# Patient Record
Sex: MALE | Race: WHITE | NOT HISPANIC OR LATINO
[De-identification: names, ages, dates, MRNs, and addresses within clinical notes are randomized per-mention and may not be internally consistent; named-entity substitution may affect disease eponyms.]

---

## 2018-09-14 ENCOUNTER — TRANSCRIPTION ENCOUNTER (OUTPATIENT)
Age: 43
End: 2018-09-14

## 2019-01-28 ENCOUNTER — TRANSCRIPTION ENCOUNTER (OUTPATIENT)
Age: 44
End: 2019-01-28

## 2019-03-17 ENCOUNTER — TRANSCRIPTION ENCOUNTER (OUTPATIENT)
Age: 44
End: 2019-03-17

## 2021-02-20 ENCOUNTER — TRANSCRIPTION ENCOUNTER (OUTPATIENT)
Age: 46
End: 2021-02-20

## 2021-03-28 ENCOUNTER — TRANSCRIPTION ENCOUNTER (OUTPATIENT)
Age: 46
End: 2021-03-28

## 2021-10-27 PROBLEM — Z00.00 ENCOUNTER FOR PREVENTIVE HEALTH EXAMINATION: Status: ACTIVE | Noted: 2021-10-27

## 2021-10-29 ENCOUNTER — APPOINTMENT (OUTPATIENT)
Dept: PULMONOLOGY | Facility: CLINIC | Age: 46
End: 2021-10-29
Payer: COMMERCIAL

## 2021-10-29 ENCOUNTER — NON-APPOINTMENT (OUTPATIENT)
Age: 46
End: 2021-10-29

## 2021-10-29 VITALS
WEIGHT: 183 LBS | TEMPERATURE: 97.3 F | SYSTOLIC BLOOD PRESSURE: 130 MMHG | HEART RATE: 60 BPM | HEIGHT: 73 IN | RESPIRATION RATE: 16 BRPM | BODY MASS INDEX: 24.25 KG/M2 | DIASTOLIC BLOOD PRESSURE: 70 MMHG | OXYGEN SATURATION: 99 %

## 2021-10-29 DIAGNOSIS — Z80.42 FAMILY HISTORY OF MALIGNANT NEOPLASM OF PROSTATE: ICD-10-CM

## 2021-10-29 DIAGNOSIS — Z88.9 ALLERGY STATUS TO UNSPECIFIED DRUGS, MEDICAMENTS AND BIOLOGICAL SUBSTANCES: ICD-10-CM

## 2021-10-29 DIAGNOSIS — Z86.19 PERSONAL HISTORY OF OTHER INFECTIOUS AND PARASITIC DISEASES: ICD-10-CM

## 2021-10-29 DIAGNOSIS — Z82.62 FAMILY HISTORY OF OSTEOPOROSIS: ICD-10-CM

## 2021-10-29 DIAGNOSIS — Z82.49 FAMILY HISTORY OF ISCHEMIC HEART DISEASE AND OTHER DISEASES OF THE CIRCULATORY SYSTEM: ICD-10-CM

## 2021-10-29 DIAGNOSIS — Z81.2 FAMILY HISTORY OF TOBACCO ABUSE AND DEPENDENCE: ICD-10-CM

## 2021-10-29 DIAGNOSIS — Z83.6 FAMILY HISTORY OF OTHER DISEASES OF THE RESPIRATORY SYSTEM: ICD-10-CM

## 2021-10-29 DIAGNOSIS — Z78.9 OTHER SPECIFIED HEALTH STATUS: ICD-10-CM

## 2021-10-29 PROCEDURE — 94618 PULMONARY STRESS TESTING: CPT

## 2021-10-29 PROCEDURE — 99204 OFFICE O/P NEW MOD 45 MIN: CPT | Mod: 25

## 2021-10-29 PROCEDURE — 94010 BREATHING CAPACITY TEST: CPT

## 2021-10-29 PROCEDURE — 71046 X-RAY EXAM CHEST 2 VIEWS: CPT

## 2021-10-29 PROCEDURE — 95012 NITRIC OXIDE EXP GAS DETER: CPT

## 2021-10-29 RX ORDER — ALBUTEROL SULFATE 90 UG/1
108 (90 BASE) AEROSOL, METERED RESPIRATORY (INHALATION) EVERY 6 HOURS
Qty: 1 | Refills: 1 | Status: ACTIVE | COMMUNITY
Start: 2021-10-29 | End: 1900-01-01

## 2021-10-29 NOTE — ASSESSMENT
[FreeTextEntry1] : Mr. CASTANEDA is a 45 year male with a history of childhood chickenpox, allergies,  nonsmoker who now comes in for an initial pulmonary evaluation witnessed apnea, SOB, PND syndrome, mild GERD, likely mild asthma, ?MARTY\par \par The patient's SOB is felt to be multifactorial:\par -poor mechanics of breathing\par -Pulmonary\par     -mild asthma (RF; allergies, GERD)\par -Cardiac (not likely)\par \par Problem 1:mild asthma (RF; allergies, GERD)\par - add Ventolin 2 puffs Q6H, pre-exercise \par - Asthma is believed to be caused by inherited (genetic) and environmental factor, but its exact cause is unknown. asthma may be triggered by allergens, lung infections, or irritants in the air. Asthma triggers are different for each person \par \par Problem 2: allergy/sinus\par -add Olopatadine 0.6% 1 sniff BID\par -add Qnasl 1 sniff BID\par -get Blood work to include: asthma panel, food IgE panel, IgE level, eosinophil level, vitamin D level\par -Environmental measures for allergies were encouraged including mattress and pillow cover, air purifier, and environmental controls. \par \par Problem 3: GERD\par -Add DGL, Pre-meal \par -Rule of 2s: avoid eating too much, eating too late, eating too spicy, eating two hours before bed.\par - Things to avoid including overeating, spicy foods, tight clothing, eating within two hours of bed, this list is not all inclusive.\par - For treatments of reflux, possible options discussed including diet control, H2 blockers, PPIs, as well as coating motility agents discussed as treatment options. Timing of meals and proximity of last meal to sleep were discussed. If symptoms persist, a formal gastrointestinal evaluation is needed. \par \par Problem 4: witnessed apnea, ?MARTY (RF: Elevated Mallampati class) \par -complete home sleep study\par -Sleep apnea is associated with adverse clinical consequences which can affect most organ systems. Cardiovascular disease risk includes arrhythmias, atrial fibrillation, hypertension, coronary artery disease, and stroke. Metabolic disorders include diabetes type 2, non-alcoholic fatty liver disease. Mood disorder especially depression; and cognitive decline especially in the elderly. Associations with chronic reflux/Lomax’s esophagus some but not all inclusive. \par -Reasons include arousal consistent with hypopnea; respiratory events most prominent in REM sleep or supine position; therefore sleep staging and body position are important for accurate diagnosis and estimation of AHI. \par  \par \par Problem 5:Cardiac (not likely)\par -Recommend cardiac follow up evaluation with cardiologist if needed \par \par Problem 6: Poor mechanics of breathing\par -Recommended Wim Hof and Buteyko breathing techniques \par - Proper breathing techniques were reviewed with an emphasis on exhalation. Patient instructed to breath in for 1 second and out for four seconds. Patient was encouraged not to talk while walking.\par \par Problem 7: Health Maintenance\par -s/p flu shot\par -recommended strep pneumonia vaccines: Prevnar-13 vaccine, follow by Pneumo vaccine 23 one year following\par -recommended early intervention for URIs\par -recommended regular osteoporosis evaluations\par -recommended early dermatological evaluations\par -recommended after the age of 50 to the age of 70, colonoscopy every 5 years\par \par f/u in 6-8 weeks\par pt is encouraged to call or fax the office with any questions or concerns.\par

## 2021-10-29 NOTE — REASON FOR VISIT
[Initial] : an initial visit [TextBox_44] : witnessed apnea, SOB, PND syndrome, mild GERD, likely mild asthma, ?MARTY

## 2021-10-29 NOTE — PROCEDURE
[FreeTextEntry1] : 6 minute walk test reveals a low saturation of 98% with no evidence of dyspnea or fatigue; walked 505.3 meters\par \par PFT revealed normal flows, with a FEV1 of 3.9L, which is 91% of predicted, with a normal flow volume loop\par \par Feno was 20; a normal value being less than 25. Fractional exhaled nitric oxide (FENO) is regarded as a simple, noninvasive method for assessing eosinophilic airway inflammation. Produced by a variety of cells within the lung, nitric oxide (NO) concentrations are generally low in healthy individuals. However, high concentrations of NO appear to be involved in nonspecific host defense mechanisms and chronic inflammatory  diseases such as asthma. The American Thoracic Society (ATS) therefore recommended using FENO to aid in the diagnosis and monitoring of eosinophilic airway inflammation and asthma, and for identifying steroid responsive individuals whose chronic respiratory symptoms may be caused by airway inflammation \par \par CXR revealed a normal sized heart; there was no evidence of infiltrate or effusion -- A normal appearing chest radiograph \par

## 2021-10-29 NOTE — ADDENDUM
[FreeTextEntry1] : Documented by Jaime Ch acting as a scribe for Dr. Varun Mascorro on (10/29/2021).\par \par All medical record entries made by the Scribe were at my, Dr. Varun Mascorro's, direction and personally dictated by me on (10/29/2021). I have reviewed the chart and agree that the record accurately reflects my personal performance of the history, physical exam, assessment and plan. I have also personally directed, reviewed, and agree with the discharge instructions.\par

## 2021-10-29 NOTE — HISTORY OF PRESENT ILLNESS
[TextBox_4] : Mr. CASTANEDA is a 45 year male with a history of childhood chickenpox, allergies,  nonsmoker who now comes in for an initial pulmonary evaluation. His chief complaint is\par -He notes witnessed episodes of apnea for years \par -s/p Covid 19 vaccine x2 4/2021\par -He denies getting full lung capacity\par -He notes SOB \par -He notes slight SOB exacerbation in cold air which brings on coughing \par -He notes itchy ears with allergies\par -He notes sneezing and PND sx for allergies during fall and spring \par -He notes recently starting heartburn and reflux \par -He notes globus sensation starting 1-2 years ago \par -He notes breathing through his mouth due to difficulty breathing through his nose\par -He notes his weight is stable \par -he reports being able to fall asleep as a passenger in a car for over an hour \par - he reports being able to fall asleep while watching a boring TV show \par -He notes his memory and concentration are good\par -He denies h/o bronchitis \par -He notes 15.5 neck size \par -He notes libido is normal \par -He notes increased sleep since working partially from home \par - \par \par - patient denies any headaches, nausea, vomiting, fever, chills, sweats, chest pain, chest pressure, palpitations, coughing, wheezing, fatigue, diarrhea, constipation, dysphagia, myalgias, dizziness, leg swelling, leg pain, itchy eyes, heartburn, reflux or sour taste in the mouth

## 2021-11-12 ENCOUNTER — LABORATORY RESULT (OUTPATIENT)
Age: 46
End: 2021-11-12

## 2021-11-12 LAB
24R-OH-CALCIDIOL SERPL-MCNC: 82.1 PG/ML
25(OH)D3 SERPL-MCNC: 40.9 NG/ML
BASOPHILS # BLD AUTO: 0.03 K/UL
BASOPHILS NFR BLD AUTO: 0.6 %
EOSINOPHIL # BLD AUTO: 0.07 K/UL
EOSINOPHIL NFR BLD AUTO: 1.5 %
HCT VFR BLD CALC: 39.9 %
HGB BLD-MCNC: 13.4 G/DL
IMM GRANULOCYTES NFR BLD AUTO: 0.4 %
LYMPHOCYTES # BLD AUTO: 1.46 K/UL
LYMPHOCYTES NFR BLD AUTO: 31.5 %
MAN DIFF?: NORMAL
MCHC RBC-ENTMCNC: 33 PG
MCHC RBC-ENTMCNC: 33.6 GM/DL
MCV RBC AUTO: 98.3 FL
MONOCYTES # BLD AUTO: 0.37 K/UL
MONOCYTES NFR BLD AUTO: 8 %
NEUTROPHILS # BLD AUTO: 2.69 K/UL
NEUTROPHILS NFR BLD AUTO: 58 %
PLATELET # BLD AUTO: 199 K/UL
RBC # BLD: 4.06 M/UL
RBC # FLD: 12.2 %
WBC # FLD AUTO: 4.64 K/UL

## 2021-11-14 LAB
A ALTERNATA IGE QN: <0.1 KUA/L
A FUMIGATUS IGE QN: <0.1 KUA/L
C ALBICANS IGE QN: <0.1 KUA/L
C HERBARUM IGE QN: <0.1 KUA/L
CAT DANDER IGE QN: 0.65 KUA/L
CLAM IGE QN: <0.1 KUA/L
CODFISH IGE QN: <0.1 KUA/L
COMMON RAGWEED IGE QN: <0.1 KUA/L
CORN IGE QN: <0.1 KUA/L
COW MILK IGE QN: <0.1 KUA/L
D FARINAE IGE QN: 0.63 KUA/L
D PTERONYSS IGE QN: 0.43 KUA/L
DEPRECATED A ALTERNATA IGE RAST QL: 0
DEPRECATED A FUMIGATUS IGE RAST QL: 0
DEPRECATED C ALBICANS IGE RAST QL: 0
DEPRECATED C HERBARUM IGE RAST QL: 0
DEPRECATED CAT DANDER IGE RAST QL: 1
DEPRECATED CLAM IGE RAST QL: 0
DEPRECATED CODFISH IGE RAST QL: 0
DEPRECATED COMMON RAGWEED IGE RAST QL: 0
DEPRECATED CORN IGE RAST QL: 0
DEPRECATED COW MILK IGE RAST QL: 0
DEPRECATED D FARINAE IGE RAST QL: 1
DEPRECATED D PTERONYSS IGE RAST QL: 1
DEPRECATED DOG DANDER IGE RAST QL: 0
DEPRECATED EGG WHITE IGE RAST QL: 0
DEPRECATED M RACEMOSUS IGE RAST QL: 0
DEPRECATED PEANUT IGE RAST QL: 0
DEPRECATED ROACH IGE RAST QL: 0
DEPRECATED SCALLOP IGE RAST QL: <0.1 KUA/L
DEPRECATED SESAME SEED IGE RAST QL: 0
DEPRECATED SHRIMP IGE RAST QL: 0
DEPRECATED SOYBEAN IGE RAST QL: 0
DEPRECATED TIMOTHY IGE RAST QL: 0
DEPRECATED WALNUT IGE RAST QL: 0
DEPRECATED WHEAT IGE RAST QL: 0
DEPRECATED WHITE OAK IGE RAST QL: 0
DOG DANDER IGE QN: <0.1 KUA/L
EGG WHITE IGE QN: <0.1 KUA/L
M RACEMOSUS IGE QN: <0.1 KUA/L
PEANUT IGE QN: <0.1 KUA/L
ROACH IGE QN: <0.1 KUA/L
SCALLOP IGE QN: 0
SCALLOP IGE QN: <0.1 KUA/L
SESAME SEED IGE QN: <0.1 KUA/L
SOYBEAN IGE QN: <0.1 KUA/L
TIMOTHY IGE QN: <0.1 KUA/L
TOTAL IGE SMQN RAST: 11 KU/L
WALNUT IGE QN: <0.1 KUA/L
WHEAT IGE QN: <0.1 KUA/L
WHITE OAK IGE QN: <0.1 KUA/L

## 2021-11-17 ENCOUNTER — NON-APPOINTMENT (OUTPATIENT)
Age: 46
End: 2021-11-17

## 2021-12-17 ENCOUNTER — APPOINTMENT (OUTPATIENT)
Dept: PULMONOLOGY | Facility: CLINIC | Age: 46
End: 2021-12-17
Payer: COMMERCIAL

## 2021-12-17 PROCEDURE — 99441: CPT

## 2022-01-07 ENCOUNTER — APPOINTMENT (OUTPATIENT)
Dept: PULMONOLOGY | Facility: CLINIC | Age: 47
End: 2022-01-07

## 2022-01-10 ENCOUNTER — APPOINTMENT (OUTPATIENT)
Dept: PULMONOLOGY | Facility: CLINIC | Age: 47
End: 2022-01-10
Payer: COMMERCIAL

## 2022-01-10 PROCEDURE — 99214 OFFICE O/P EST MOD 30 MIN: CPT | Mod: 95

## 2022-01-10 NOTE — ADDENDUM
[FreeTextEntry1] : Documented by Ailyn Aleman acting as a scribe for Dr. Varun Mascorro on (01/10/2022).\par \par All medical record entries made by the Scribe were at my, Dr. Varun Mascorro's, direction and personally dictated by me on (01/10/2022). I have reviewed the chart and agree that the record accurately reflects my personal performance of the history, physical exam, assessment and plan. I have also personally directed, reviewed, and agree with the discharge instructions.\par \par

## 2022-01-10 NOTE — PROCEDURE
[FreeTextEntry1] : Sleep study (nov.29.2021) revealed sleep apnea with an AHI/OLGA of 5.6 , snore index of % and a low oxygen saturation of 88%\par

## 2022-01-10 NOTE — HISTORY OF PRESENT ILLNESS
[Home] : at home, [unfilled] , at the time of the visit. [Medical Office: ([facility])___] : at the medical office located in  [TextBox_4] : Mr. CASTANEDA is a 46 year male with a history of childhood chickenpox, allergies,  nonsmoker who now comes in via video call for a follow up pulmonary evaluation. His chief complaint is\par - he notes he has been feeling fine \par - he has been using the 2 new sprays prescribed for him and they have been helpful. \par - he has been breathing fine \par - he has been exercising, he runs for exercise \par - energy level has been good \par - no new medications, vitamins, or supplements \par - he has been taking DGL supplement and he finds it has helped a little \par - he does not feel heart burn regularly. \par - weight has been stable \par patient denies any headaches, nausea, vomiting, fever, chills, sweats, chest pain, chest pressure, palpitations, coughing, wheezing, fatigue, diarrhea, constipation, dysphagia, myalgias, dizziness, leg swelling, leg pain, itchy eyes, itchy ears, heartburn, reflux or sour taste in the mouth\par

## 2022-01-10 NOTE — ASSESSMENT
[FreeTextEntry1] : Mr. CASTANEDA is a 46 year male with a history of childhood chickenpox, allergies,  nonsmoker who now comes in via video call for a follow upl pulmonary evaluation witnessed apnea, SOB, PND syndrome, mild GERD, likely mild asthma, allergies,  (+)MARTY \par \par The patient's SOB is felt to be multifactorial:\par -poor mechanics of breathing\par -Pulmonary\par     -mild asthma (RF; allergies, GERD)\par -Cardiac (not likely)\par \par Problem 1:mild asthma (RF; allergies, GERD)\par - continue Ventolin 2 puffs Q6H, pre-exercise \par - Asthma is believed to be caused by inherited (genetic) and environmental factor, but its exact cause is unknown. asthma may be triggered by allergens, lung infections, or irritants in the air. Asthma triggers are different for each person \par \par Problem 2: allergy/sinus\par -continue Olopatadine 0.6% 1 sniff BID\par -continue Qnasl 1 sniff BID\par -s/p Blood work to include: asthma panel(+), food IgE panel(-), IgE level(-), eosinophil level(-), vitamin D level(-)\par -Environmental measures for allergies were encouraged including mattress and pillow cover, air purifier, and environmental controls. \par \par Problem 3: GERD\par -Add DGL, Pre-meal \par -Rule of 2s: avoid eating too much, eating too late, eating too spicy, eating two hours before bed.\par - Things to avoid including overeating, spicy foods, tight clothing, eating within two hours of bed, this list is not all inclusive.\par - For treatments of reflux, possible options discussed including diet control, H2 blockers, PPIs, as well as coating motility agents discussed as treatment options. Timing of meals and proximity of last meal to sleep were discussed. If symptoms persist, a formal gastrointestinal evaluation is needed. \par \par Problem 4: witnessed apnea, (+)MARTY (RF: Elevated Mallampati class) (+) OSAS \par -s/p home sleep study\par - recommended Somnifix and Slumber Bump \par -Sleep apnea is associated with adverse clinical consequences which can affect most organ systems. Cardiovascular disease risk includes arrhythmias, atrial fibrillation, hypertension, coronary artery disease, and stroke. Metabolic disorders include diabetes type 2, non-alcoholic fatty liver disease. Mood disorder especially depression; and cognitive decline especially in the elderly. Associations with chronic reflux/Lomax’s esophagus some but not all inclusive. \par -Reasons include arousal consistent with hypopnea; respiratory events most prominent in REM sleep or supine position; therefore sleep staging and body position are important for accurate diagnosis and estimation of AHI. \par  \par \par Problem 5:Cardiac (not likely) at this time \par -Recommend cardiac follow up evaluation with cardiologist if needed \par \par Problem 6: Poor mechanics of breathing\par -Recommended Wim Hof and Buteyko breathing techniques \par - Proper breathing techniques were reviewed with an emphasis on exhalation. Patient instructed to breath in for 1 second and out for four seconds. Patient was encouraged not to talk while walking.\par \par Problem 7: Health Maintenance\par -s/p flu shot 2021 \par - s/p covid-19 x3 \par -recommended strep pneumonia vaccines: Prevnar-13 vaccine, follow by Pneumo vaccine 23 one year following\par -recommended early intervention for URIs\par -recommended regular osteoporosis evaluations\par -recommended early dermatological evaluations\par -recommended after the age of 50 to the age of 70, colonoscopy every 5 years\par \par f/u in 6-8 weeks\par pt is encouraged to call or fax the office with any questions or concerns.\par

## 2022-01-10 NOTE — REASON FOR VISIT
[Follow-Up] : a follow-up visit [TextBox_44] : via video call: witnessed apnea, SOB, PND syndrome, mild GERD, likely mild asthma, (+)MARTY mild

## 2022-07-14 ENCOUNTER — NON-APPOINTMENT (OUTPATIENT)
Age: 47
End: 2022-07-14

## 2022-12-20 ENCOUNTER — RX RENEWAL (OUTPATIENT)
Age: 47
End: 2022-12-20

## 2023-01-06 ENCOUNTER — APPOINTMENT (OUTPATIENT)
Dept: PULMONOLOGY | Facility: CLINIC | Age: 48
End: 2023-01-06
Payer: COMMERCIAL

## 2023-01-06 PROCEDURE — 99213 OFFICE O/P EST LOW 20 MIN: CPT | Mod: 95

## 2023-01-16 NOTE — REVIEW OF SYSTEMS
[Seasonal Allergies] : seasonal allergies [Negative] : Psychiatric [Hay Fever] : no hay fever [Watery Eyes] : no watery eyes [Itchy Eyes] : no itchy eyes [Nasal Discharge] : no nasal discharge [Hives] : no hives [Angioedema] : no angioedema [Immunocompromised] : not immunocompromised

## 2023-01-16 NOTE — HISTORY OF PRESENT ILLNESS
[TextBox_4] : Mr. CASTANEDA is a 47 year male with a history of childhood chickenpox, allergies, nonsmoker who now presents via video call for a follow up pulmonary evaluation. \par \par His chief complaint is medication renewal.\par \par Patient reports he is in normal state of health. He reports he uses olopatadine and qnasl nasal spray with benefit. Patient states he hasn't need to use Ventolin rescue inhaler. \par Patient denies GERD. He reports he sleeps well, appetite is good and weight is stable.\par \par Patient denies fever, chills, SOB @ rest or exertion, cough, wheezing, nasal congestion, runny nose or heartburn/reflux.\par \par

## 2023-01-16 NOTE — REASON FOR VISIT
[Home] : at home, [unfilled] , at the time of the visit. [Medical Office: ([facility])___] : at the medical office located in  [Patient] : the patient [Follow-Up] : a follow-up visit [Asthma] : asthma

## 2023-01-16 NOTE — ASSESSMENT
[FreeTextEntry1] : Mr. CASTANEDA is a 47 year male with a history of childhood chickenpox, allergies, nonsmoker who now presents via video call for a follow up pulmonary evaluation. \par \par His chief complaint is medication renewal.\par \par 1. Allergy\par -continue Olopatadine 0.6% 1 sniff BID\par -continue Qnasl 1 sniff BID\par \par 2.mild asthma\par - continue Ventolin 2 puffs Q6H, pre-exercise \par \par Patient to follow up with Dr. Mascorro in 2 month with full PFTs.\par Patient to call with further questions and concerns.\par Patient verbalizes understanding of care plan and is agreeable.\par

## 2023-08-15 ENCOUNTER — APPOINTMENT (OUTPATIENT)
Dept: PULMONOLOGY | Facility: CLINIC | Age: 48
End: 2023-08-15
Payer: COMMERCIAL

## 2023-08-15 VITALS
SYSTOLIC BLOOD PRESSURE: 128 MMHG | WEIGHT: 172 LBS | HEIGHT: 73 IN | OXYGEN SATURATION: 99 % | TEMPERATURE: 97.2 F | DIASTOLIC BLOOD PRESSURE: 64 MMHG | HEART RATE: 44 BPM | BODY MASS INDEX: 22.8 KG/M2 | RESPIRATION RATE: 16 BRPM

## 2023-08-15 DIAGNOSIS — R06.83 SNORING: ICD-10-CM

## 2023-08-15 PROCEDURE — 94010 BREATHING CAPACITY TEST: CPT

## 2023-08-15 PROCEDURE — 95012 NITRIC OXIDE EXP GAS DETER: CPT

## 2023-08-15 PROCEDURE — 99214 OFFICE O/P EST MOD 30 MIN: CPT | Mod: 25

## 2023-08-15 RX ORDER — FLUTICASONE PROPIONATE 50 UG/1
50 SPRAY, METERED NASAL TWICE DAILY
Qty: 3 | Refills: 1 | Status: ACTIVE | COMMUNITY

## 2023-08-15 NOTE — ADDENDUM
[FreeTextEntry1] : Documented by Pranav Faulkner acting as a scribe for Dr. Varun Mascorro on 08/15/2023 .  All medical record entries made by the Scribe were at my, Dr. Varun Mascorro's, direction and personally dictated by me on 08/15/2023 . I have reviewed the chart and agree that the record accurately reflects my personal performance of the history, physical exam, assessment and plan. I have also personally directed, reviewed, and agree with the discharge instructions.

## 2023-08-15 NOTE — PROCEDURE
[FreeTextEntry1] : PFT reveals normal flows, with an FEV1 of 4.05 L, which is 93% of predicted, with a flattened inspiratory limb. PFTs were performed to evaluate for Asthma  FENO was 13; a normal value being less than 25 Fractional exhaled nitric oxide (FENO) is regarded as a simple, noninvasive method for assessing eosinophilic airway inflammation. Produced by a variety of cells within the lung, nitric oxide (NO) concentrations are generally low in healthy individuals. However, high concentrations of NO appear to be involved in nonspecific host defense mechanisms and chronic inflammatory diseases such as asthma. The American Thoracic Society (ATS) therefore has recommended using FENO to aid in the diagnosis and monitoring of eosinophilic airway inflammation and asthma, and for identifying steroid responsive individuals whose chronic respiratory symptoms may be caused by airway inflammation.

## 2023-08-15 NOTE — ASSESSMENT
[FreeTextEntry1] : Mr. CASTANEDA is a 47 year male with a history of childhood chickenpox, allergies, nonsmoker who now comes in for a follow up pulmonary evaluation witnessed apnea, SOB, PND syndrome, mild GERD, likely mild asthma, allergies, (+)MARTY - Stable  The patient's SOB is felt to be multifactorial: -poor mechanics of breathing -Pulmonary     -mild asthma (RF; allergies, GERD) -Cardiac (not likely)  Problem 1: mild asthma (RF; allergies, GERD) - continue Ventolin 2 puffs Q6H, pre-exercise  - Asthma is believed to be caused by inherited (genetic) and environmental factor, but its exact cause is unknown. asthma may be triggered by allergens, lung infections, or irritants in the air. Asthma triggers are different for each person   Problem 2: allergy/sinus -continue Olopatadine 0.6% 1 sniff BID -continue Qnasl 1 sniff BID -s/p Blood work to include: asthma panel(+), food IgE panel(-), IgE level(-), eosinophil level(-), vitamin D level(-) -Environmental measures for allergies were encouraged including mattress and pillow cover, air purifier, and environmental controls.   Problem 3: GERD -Add DGL, Pre-meal  -Rule of 2s: avoid eating too much, eating too late, eating too spicy, eating two hours before bed. - Things to avoid including overeating, spicy foods, tight clothing, eating within two hours of bed, this list is not all inclusive. - For treatments of reflux, possible options discussed including diet control, H2 blockers, PPIs, as well as coating motility agents discussed as treatment options. Timing of meals and proximity of last meal to sleep were discussed. If symptoms persist, a formal gastrointestinal evaluation is needed.   Problem 4: witnessed apnea, (+) MARTY (RF: Elevated Mallampati class) (+) OSAS  -s/p home sleep study - Insignificant and controlled with nasal sprays  - recommended Somnifix and Slumber Bump  -Sleep apnea is associated with adverse clinical consequences which can affect most organ systems. Cardiovascular disease risk includes arrhythmias, atrial fibrillation, hypertension, coronary artery disease, and stroke. Metabolic disorders include diabetes type 2, non-alcoholic fatty liver disease. Mood disorder especially depression; and cognitive decline especially in the elderly. Associations with chronic reflux/Lomax's esophagus some but not all inclusive.  -Reasons include arousal consistent with hypopnea; respiratory events most prominent in REM sleep or supine position; therefore sleep staging and body position are important for accurate diagnosis and estimation of AHI.     Problem 5:Cardiac (not likely) at this time  -Recommend cardiac follow up evaluation with cardiologist if needed   Problem 6: Poor mechanics of breathing -Recommended Wichristin Hof and Buteyko breathing techniques  - Proper breathing techniques were reviewed with an emphasis on exhalation. Patient instructed to breath in for 1 second and out for four seconds. Patient was encouraged not to talk while walking.  Problem 7: Health Maintenance -s/p flu shot 2021  - s/p covid-19 x3  -recommended strep pneumonia vaccines: Prevnar-13 vaccine, follow by Pneumo vaccine 23 one year following -recommended early intervention for URIs -recommended regular osteoporosis evaluations -recommended early dermatological evaluations -recommended after the age of 50 to the age of 70, colonoscopy every 5 years  f/u in 6-8 weeks pt is encouraged to call or fax the office with any questions or concerns.

## 2023-08-15 NOTE — HISTORY OF PRESENT ILLNESS
[TextBox_4] : Mr. CASTANEDA is a 47 year male with a history of childhood chickenpox, allergies,  nonsmoker who now comes in for a pulmonary evaluation. His chief complaint is  - he notes feeling generally well - he notes his sinuses are clear - he notes sticking to his sinus regimen which is helping keep them clear - vision is stable - he notes running with no limitations - he notes his heartburn and reflux is controlled and better - he notes weight training and running 4-6 miles for exercise - he notes his weight is stable - he notes he was unaffected by the [employer] air - he notes no new medications, vitamins, or supplements  -he denies any headaches, nausea, emesis, fever, chills, sweats, chest pain, chest pressure, coughing, wheezing, palpitations, constipation, diarrhea, vertigo, dysphagia, heartburn, reflux, itchy eyes, itchy ears, leg swelling, leg pain, arthralgias, myalgias, or sour taste in the mouth.

## 2023-08-15 NOTE — REASON FOR VISIT
[Follow-Up] : a follow-up visit [TextBox_44] : witnessed apnea, SOB, PND syndrome, mild GERD, likely mild asthma, (+)MARTY mild

## 2024-02-09 ENCOUNTER — TRANSCRIPTION ENCOUNTER (OUTPATIENT)
Age: 49
End: 2024-02-09

## 2024-02-09 ENCOUNTER — APPOINTMENT (OUTPATIENT)
Dept: PULMONOLOGY | Facility: CLINIC | Age: 49
End: 2024-02-09
Payer: COMMERCIAL

## 2024-02-09 VITALS
WEIGHT: 172 LBS | DIASTOLIC BLOOD PRESSURE: 70 MMHG | OXYGEN SATURATION: 97 % | TEMPERATURE: 97.1 F | SYSTOLIC BLOOD PRESSURE: 110 MMHG | RESPIRATION RATE: 16 BRPM | HEIGHT: 73 IN | HEART RATE: 71 BPM | BODY MASS INDEX: 22.8 KG/M2

## 2024-02-09 DIAGNOSIS — R06.02 SHORTNESS OF BREATH: ICD-10-CM

## 2024-02-09 DIAGNOSIS — J45.20 MILD INTERMITTENT ASTHMA, UNCOMPLICATED: ICD-10-CM

## 2024-02-09 DIAGNOSIS — R06.81 APNEA, NOT ELSEWHERE CLASSIFIED: ICD-10-CM

## 2024-02-09 DIAGNOSIS — K21.9 GASTRO-ESOPHAGEAL REFLUX DISEASE W/OUT ESOPHAGITIS: ICD-10-CM

## 2024-02-09 DIAGNOSIS — Z23 ENCOUNTER FOR IMMUNIZATION: ICD-10-CM

## 2024-02-09 DIAGNOSIS — J45.909 UNSPECIFIED ASTHMA, UNCOMPLICATED: ICD-10-CM

## 2024-02-09 DIAGNOSIS — J30.9 ALLERGIC RHINITIS, UNSPECIFIED: ICD-10-CM

## 2024-02-09 DIAGNOSIS — G47.33 OBSTRUCTIVE SLEEP APNEA (ADULT) (PEDIATRIC): ICD-10-CM

## 2024-02-09 PROCEDURE — 99214 OFFICE O/P EST MOD 30 MIN: CPT | Mod: 25

## 2024-02-09 PROCEDURE — 90715 TDAP VACCINE 7 YRS/> IM: CPT

## 2024-02-09 PROCEDURE — 90471 IMMUNIZATION ADMIN: CPT

## 2024-02-09 PROCEDURE — 95012 NITRIC OXIDE EXP GAS DETER: CPT

## 2024-02-09 PROCEDURE — 94010 BREATHING CAPACITY TEST: CPT

## 2024-02-09 NOTE — ADDENDUM
[FreeTextEntry1] : Documented by Esteban Pinto acting as a scribe for Dr. Varun Mascorro on 02/09/2024. All medical record entries made by the Scribe were at my, Dr. Varun Mascorro's, direction and personally dictated by me on 02/09/2024. I have reviewed the chart and agree that the record accurately reflects my personal performance of the history, physical exam, assessment and plan. I have also personally directed, reviewed, and agree with the discharge instructions.

## 2024-02-09 NOTE — ASSESSMENT
[FreeTextEntry1] : Mr. CASTANEDA is a 48 year male with a history of childhood chickenpox, allergies, nonsmoker who now comes in for a follow up pulmonary evaluation witnessed apnea, SOB, PND syndrome, mild GERD, likely mild asthma, allergies, (+)MARTY - Stable/thriving   The patient's SOB is felt to be multifactorial: -poor mechanics of breathing -Pulmonary     -mild asthma (RF; allergies, GERD) -Cardiac (not likely)  Problem 1: mild asthma (RF; allergies, GERD) - continue Ventolin 2 puffs Q6H, pre-exercise  - Asthma is believed to be caused by inherited (genetic) and environmental factor, but its exact cause is unknown. asthma may be triggered by allergens, lung infections, or irritants in the air. Asthma triggers are different for each person   Problem 2: allergy/sinus -continue Olopatadine 0.6% 1 sniff BID -continue Qnasl 1 sniff BID -s/p Blood work to include: asthma panel(+), food IgE panel(-), IgE level(-), eosinophil level(-), vitamin D level(-) -Environmental measures for allergies were encouraged including mattress and pillow cover, air purifier, and environmental controls.   Problem 3: GERD -Add DGL, Pre-meal  -Rule of 2s: avoid eating too much, eating too late, eating too spicy, eating two hours before bed. - Things to avoid including overeating, spicy foods, tight clothing, eating within two hours of bed, this list is not all inclusive. - For treatments of reflux, possible options discussed including diet control, H2 blockers, PPIs, as well as coating motility agents discussed as treatment options. Timing of meals and proximity of last meal to sleep were discussed. If symptoms persist, a formal gastrointestinal evaluation is needed.   Problem 4: witnessed apnea, (+) MARTY (RF: Elevated Mallampati class) (+) OSAS  -s/p home sleep study - Insignificant and controlled with nasal sprays  - recommended Somnifix and Slumber Bump  -Sleep apnea is associated with adverse clinical consequences which can affect most organ systems. Cardiovascular disease risk includes arrhythmias, atrial fibrillation, hypertension, coronary artery disease, and stroke. Metabolic disorders include diabetes type 2, non-alcoholic fatty liver disease. Mood disorder especially depression; and cognitive decline especially in the elderly. Associations with chronic reflux/Lomax's esophagus some but not all inclusive.  -Reasons include arousal consistent with hypopnea; respiratory events most prominent in REM sleep or supine position; therefore sleep staging and body position are important for accurate diagnosis and estimation of AHI.     Problem 5:Cardiac (not likely) at this time  -Recommend cardiac follow up evaluation with cardiologist if needed   Problem 6: Poor mechanics of breathing -Recommended Wichristin Hof and Buteyko breathing techniques  - Proper breathing techniques were reviewed with an emphasis on exhalation. Patient instructed to breath in for 1 second and out for four seconds. Patient was encouraged not to talk while walking.  Problem 7: Health Maintenance -s/p flu shot 2023 - s/p covid-19 x3  -recommended "[employer]" by Franco Knapp  -recommended strep pneumonia vaccines: Prevnar-13 vaccine, follow by Pneumo vaccine 23 one year following -recommended early intervention for URIs -recommended regular osteoporosis evaluations -recommended early dermatological evaluations -recommended after the age of 50 to the age of 70, colonoscopy every 5 years  f/u in 6-8 weeks pt is encouraged to call or fax the office with any questions or concerns.

## 2024-02-09 NOTE — HISTORY OF PRESENT ILLNESS
[TextBox_4] : Mr. CASTANEDA is a 48 year male with a history of childhood chickenpox, allergies,  nonsmoker who now comes in for a pulmonary evaluation. His chief complaint is  -he notes feeling generally well -he notes exercising (strength and running) - he notes GERD Sx have worsened recently but is now controlled -he notes good quality of sleep -he notes sleeping for 6 hours  -he notes energy levels are good  -he denies any headaches, nausea, emesis, fever, chills, sweats, chest pain, chest pressure, coughing, wheezing, palpitations, constipation, diarrhea, vertigo, dysphagia, itchy eyes, itchy ears, leg swelling, leg pain, arthralgias, myalgias, or sour taste in the mouth.

## 2024-02-09 NOTE — PROCEDURE
[FreeTextEntry1] : PFTs revealed mild restrictive dysfunction; FEV1 was  2.93L, which is 67.8% of predicted; abnormal inspiratory limb.  PFTs were performed to evaluate for SOB  FENO was 22; a normal value being less than 25 Fractional exhaled nitric oxide (FENO) is regarded as a simple, noninvasive method for assessing eosinophilic airway inflammation. Produced by a variety of cells within the lung, nitric oxide (NO) concentrations are generally low in healthy individuals. However, high concentrations of NO appear to be involved in nonspecific host defense mechanisms and chronic inflammatory diseases such as asthma. The American Thoracic Society (ATS) therefore has recommended using FENO to aid in the diagnosis and monitoring of eosinophilic airway inflammation and asthma, and for identifying steroid responsive individuals whose chronic respiratory symptoms may be caused by airway inflammation.   The American Thoracic Society (ATS) strongly recommends the use of FeNO measurement to aid in the assessment, management, and long-term monitoring of asthma. In their 2011 clinical practice guideline, the ATS emphasizes the importance of using FeNO.

## 2024-02-12 RX ORDER — BECLOMETHASONE DIPROPIONATE 80 UG/1
80 AEROSOL, METERED NASAL
Qty: 3 | Refills: 1 | Status: ACTIVE | COMMUNITY
Start: 2021-10-29 | End: 1900-01-01

## 2024-02-12 RX ORDER — OLOPATADINE HYDROCHLORIDE 665 UG/1
0.6 SPRAY, METERED NASAL
Qty: 3 | Refills: 1 | Status: ACTIVE | COMMUNITY
Start: 2021-10-29 | End: 1900-01-01

## 2024-08-09 ENCOUNTER — APPOINTMENT (OUTPATIENT)
Dept: PULMONOLOGY | Facility: CLINIC | Age: 49
End: 2024-08-09

## 2025-06-25 ENCOUNTER — NON-APPOINTMENT (OUTPATIENT)
Age: 50
End: 2025-06-25

## 2025-07-11 ENCOUNTER — APPOINTMENT (OUTPATIENT)
Dept: PULMONOLOGY | Facility: CLINIC | Age: 50
End: 2025-07-11
Payer: COMMERCIAL

## 2025-07-11 VITALS
OXYGEN SATURATION: 99 % | HEART RATE: 54 BPM | RESPIRATION RATE: 16 BRPM | BODY MASS INDEX: 23.19 KG/M2 | SYSTOLIC BLOOD PRESSURE: 124 MMHG | WEIGHT: 175 LBS | HEIGHT: 73 IN | TEMPERATURE: 97.3 F | DIASTOLIC BLOOD PRESSURE: 70 MMHG

## 2025-07-11 PROCEDURE — 94010 BREATHING CAPACITY TEST: CPT

## 2025-07-11 PROCEDURE — 99214 OFFICE O/P EST MOD 30 MIN: CPT | Mod: 25

## 2025-07-11 PROCEDURE — 94727 GAS DIL/WSHOT DETER LNG VOL: CPT

## 2025-07-11 PROCEDURE — ZZZZZ: CPT

## 2025-07-11 PROCEDURE — 94729 DIFFUSING CAPACITY: CPT
